# Patient Record
Sex: MALE | Race: WHITE | URBAN - METROPOLITAN AREA
[De-identification: names, ages, dates, MRNs, and addresses within clinical notes are randomized per-mention and may not be internally consistent; named-entity substitution may affect disease eponyms.]

---

## 2017-05-02 ENCOUNTER — IMPORTED ENCOUNTER (OUTPATIENT)
Dept: URBAN - METROPOLITAN AREA CLINIC 50 | Facility: CLINIC | Age: 72
End: 2017-05-02

## 2017-08-01 ENCOUNTER — IMPORTED ENCOUNTER (OUTPATIENT)
Dept: URBAN - METROPOLITAN AREA CLINIC 50 | Facility: CLINIC | Age: 72
End: 2017-08-01

## 2017-08-02 ENCOUNTER — IMPORTED ENCOUNTER (OUTPATIENT)
Dept: URBAN - METROPOLITAN AREA CLINIC 50 | Facility: CLINIC | Age: 72
End: 2017-08-02

## 2017-08-04 ENCOUNTER — IMPORTED ENCOUNTER (OUTPATIENT)
Dept: URBAN - METROPOLITAN AREA CLINIC 50 | Facility: CLINIC | Age: 72
End: 2017-08-04

## 2017-08-10 ENCOUNTER — IMPORTED ENCOUNTER (OUTPATIENT)
Dept: URBAN - METROPOLITAN AREA CLINIC 50 | Facility: CLINIC | Age: 72
End: 2017-08-10

## 2017-08-15 ENCOUNTER — IMPORTED ENCOUNTER (OUTPATIENT)
Dept: URBAN - METROPOLITAN AREA CLINIC 50 | Facility: CLINIC | Age: 72
End: 2017-08-15

## 2017-08-15 NOTE — PATIENT DISCUSSION
"""[place] with IOL OS: 08/24/2017 [employer] MX60 20.0 (OR[place]) Target: [facility] [employer]

## 2017-08-24 ENCOUNTER — IMPORTED ENCOUNTER (OUTPATIENT)
Dept: URBAN - METROPOLITAN AREA CLINIC 50 | Facility: CLINIC | Age: 72
End: 2017-08-24

## 2017-08-29 ENCOUNTER — IMPORTED ENCOUNTER (OUTPATIENT)
Dept: URBAN - METROPOLITAN AREA CLINIC 50 | Facility: CLINIC | Age: 72
End: 2017-08-29

## 2017-10-03 ENCOUNTER — IMPORTED ENCOUNTER (OUTPATIENT)
Dept: URBAN - METROPOLITAN AREA CLINIC 50 | Facility: CLINIC | Age: 72
End: 2017-10-03

## 2018-01-02 ENCOUNTER — IMPORTED ENCOUNTER (OUTPATIENT)
Dept: URBAN - METROPOLITAN AREA CLINIC 50 | Facility: CLINIC | Age: 73
End: 2018-01-02

## 2018-05-01 ENCOUNTER — IMPORTED ENCOUNTER (OUTPATIENT)
Dept: URBAN - METROPOLITAN AREA CLINIC 50 | Facility: CLINIC | Age: 73
End: 2018-05-01

## 2018-07-24 ENCOUNTER — IMPORTED ENCOUNTER (OUTPATIENT)
Dept: URBAN - METROPOLITAN AREA CLINIC 50 | Facility: CLINIC | Age: 73
End: 2018-07-24

## 2019-04-26 NOTE — PATIENT DISCUSSION
[place] Visual Field 36 point screen: I have reviewed the visual fields both taped and untaped on this patient which demonstrate significant obstruction of the patient's peripheral visual field on both eyes.

## 2019-06-11 NOTE — PATIENT DISCUSSION
Also, please do not hesitate to call us if you have any concerns not addressed by this information. Please call [phone] and we will do everything we can to help you during this period.

## 2021-04-17 ASSESSMENT — VISUAL ACUITY
OD_CC: J2@ 15 IN
OS_SC: 20/25-2
OS_SC: 20/30-
OD_CC: J1+
OD_SC: 20/70
OD_CC: J1+
OS_CC: J2@ 15 IN
OS_SC: 20/20
OS_SC: 20/30-
OS_SC: 20/25
OD_BAT: 20/100
OD_PH: 20/30
OD_PH: @ 19 IN
OD_OTHER: 20/30-1. 20/40-1.
OD_OTHER: 20/50. 20/400.
OD_BAT: 20/30-1
OS_CC: 20/40
OS_OTHER: 20/50. 20/100.
OS_OTHER: 20/30-. 20/30-.
OD_SC: 20/80-
OS_CC: J1+
OD_SC: 20/80
OS_BAT: 20/50
OS_SC: 20/20
OD_PH: 20/40
OS_BAT: 20/30-
OS_CC: J1
OD_BAT: 20/50
OS_BAT: 20/25
OS_BAT: 20/30-
OD_SC: 20/50
OS_SC: 20/20
OD_OTHER: 20/100. <20/400.
OD_BAT: 20/50
OS_BAT: 20/20
OS_OTHER: 20/20. 20/20.
OS_OTHER: 20/25. 20/30.
OD_CC: J1+@ 18 IN
OD_PH: 20/30
OD_PH: 20/20
OS_OTHER: 20/40. 20/300.
OD_BAT: 20/50
OD_BAT: 20/50
OD_SC: 20/200
OS_PH: @ 15 IN
OD_OTHER: 20/50. 20/60.
OD_CC: J1
OS_OTHER: 20/30-. 20/40-.
OD_SC: 20/200
OD_SC: 20/200
OD_OTHER: 20/50. 20/60.
OD_OTHER: 20/50. 20/60.
OD_SC: 20/100+1
OD_SC: 20/100
OD_SC: 20/100
OS_BAT: 20/30-
OS_CC: J1+@ 18 IN
OS_CC: J1+
OS_SC: 20/25
OS_BAT: 20/40
OS_OTHER: 20/30-. 20/30-.
OS_SC: 20/20
OD_PH: @ 15 IN

## 2021-04-17 ASSESSMENT — TONOMETRY
OS_IOP_MMHG: 13
OS_IOP_MMHG: 13
OD_IOP_MMHG: 15
OD_IOP_MMHG: 12
OD_IOP_MMHG: 15
OS_IOP_MMHG: 11
OS_IOP_MMHG: 16
OS_IOP_MMHG: 09
OS_IOP_MMHG: 11
OD_IOP_MMHG: 13
OD_IOP_MMHG: 11
OD_IOP_MMHG: 14
OS_IOP_MMHG: 11
OS_IOP_MMHG: 11
OD_IOP_MMHG: 11
OS_IOP_MMHG: 10
OD_IOP_MMHG: 11
OS_IOP_MMHG: 12
OD_IOP_MMHG: 11
OD_IOP_MMHG: 10

## 2021-08-13 ENCOUNTER — PREPPED CHART (OUTPATIENT)
Dept: URBAN - METROPOLITAN AREA CLINIC 52 | Facility: CLINIC | Age: 76
End: 2021-08-13

## 2021-08-17 ENCOUNTER — NEW PATIENT COMPREHENSIVE (OUTPATIENT)
Dept: URBAN - METROPOLITAN AREA CLINIC 52 | Facility: CLINIC | Age: 76
End: 2021-08-17

## 2021-08-17 DIAGNOSIS — D31.32: ICD-10-CM

## 2021-08-17 DIAGNOSIS — H16.223: ICD-10-CM

## 2021-08-17 DIAGNOSIS — H26.492: ICD-10-CM

## 2021-08-17 PROCEDURE — 92004 COMPRE OPH EXAM NEW PT 1/>: CPT

## 2021-08-17 PROCEDURE — 66821 AFTER CATARACT LASER SURGERY: CPT

## 2021-08-17 RX ORDER — PREDNISOLONE ACETATE 10 MG/ML: 1 SUSPENSION/ DROPS OPHTHALMIC TWICE A DAY

## 2021-08-17 ASSESSMENT — TONOMETRY
OD_IOP_MMHG: 12
OS_IOP_MMHG: 12

## 2021-08-17 ASSESSMENT — KERATOMETRY
OS_AXISANGLE_DEGREES: 90
OD_AXISANGLE_DEGREES: 81
OS_K1POWER_DIOPTERS: 46.00
OD_K2POWER_DIOPTERS: 46.25
OS_AXISANGLE2_DEGREES: 180
OD_AXISANGLE2_DEGREES: 171
OD_K1POWER_DIOPTERS: 47.25
OS_K2POWER_DIOPTERS: 46.00

## 2021-08-17 ASSESSMENT — VISUAL ACUITY
OD_PH: 20/30
OS_GLARE: 20/200
OS_GLARE: 20/400
OU_SC: J1 @ 18 INCHES
OD_SC: 20/100
OS_SC: 20/40
OD_GLARE: 20/200
OD_GLARE: 20/400

## 2021-08-17 NOTE — PATIENT DISCUSSION
PCO ([id number]): Visually significant PCO present on exam today. Recommend YAG laser capsulotomy to improve vision and decrease glare symptoms. RBAs of procedure discussed. Patient agrees and wishes to proceed. Yag Cap OS done today with patient's signed consent. Patient instructed to start Prednisolone Acetate and taper as directed.

## 2021-08-17 NOTE — PROCEDURE NOTE: CLINICAL
PROCEDURE NOTE: YAG Capsulotomy OS. Diagnosis: Posterior Capsular Opacification (PCO). Prep: Mydriacil 1% and Phenylephrine 2.5%. Prior to treatment, the risks/benefits/alternatives were discussed. The patient wished to proceed with procedure. Consent was signed. Proparacaine and brominidine were placed into the operative eye after the eye was dilated. Power = 1.7mJ. Number of pulses = 35. Patient tolerated procedure well and there were no complications. Post Laser instructions given. Fadia Mares

## 2021-11-24 ENCOUNTER — YAG CAPSULOTOMY PO (OUTPATIENT)
Dept: URBAN - METROPOLITAN AREA CLINIC 52 | Facility: CLINIC | Age: 76
End: 2021-11-24

## 2021-11-24 DIAGNOSIS — Z98.890: ICD-10-CM

## 2021-11-24 PROCEDURE — 99024 POSTOP FOLLOW-UP VISIT: CPT

## 2021-11-24 PROCEDURE — 92015NC REFRACTION NO CHARGE

## 2021-11-24 ASSESSMENT — KERATOMETRY
OS_K2POWER_DIOPTERS: 46.00
OD_K1POWER_DIOPTERS: 47.25
OS_AXISANGLE2_DEGREES: 180
OS_K1POWER_DIOPTERS: 46.00
OS_AXISANGLE_DEGREES: 90
OD_K2POWER_DIOPTERS: 46.25
OD_AXISANGLE_DEGREES: 81
OD_AXISANGLE2_DEGREES: 171

## 2021-11-24 ASSESSMENT — TONOMETRY
OS_IOP_MMHG: 13
OD_IOP_MMHG: 12

## 2021-11-24 ASSESSMENT — VISUAL ACUITY
OU_SC: 20/20-2
OS_SC: 20/25-1
OU_SC: J1 @16"
OD_SC: 20/100

## 2021-11-24 NOTE — PATIENT DISCUSSION
Glasses prescription not recommended secondary to [address] [place] [address] St.. Patient also defers.

## 2023-04-11 ENCOUNTER — COMPREHENSIVE EXAM (OUTPATIENT)
Dept: URBAN - METROPOLITAN AREA CLINIC 52 | Facility: CLINIC | Age: 78
End: 2023-04-11

## 2023-04-11 DIAGNOSIS — H16.223: ICD-10-CM

## 2023-04-11 DIAGNOSIS — H02.202: ICD-10-CM

## 2023-04-11 DIAGNOSIS — H02.205: ICD-10-CM

## 2023-04-11 DIAGNOSIS — D31.32: ICD-10-CM

## 2023-04-11 PROCEDURE — 92014 COMPRE OPH EXAM EST PT 1/>: CPT

## 2023-04-11 ASSESSMENT — VISUAL ACUITY
OD_SC: 20/80
OU_SC: 20/20
OS_SC: 20/20
OU_SC: J1+

## 2023-04-11 ASSESSMENT — KERATOMETRY
OD_K2POWER_DIOPTERS: 46.25
OS_AXISANGLE2_DEGREES: 180
OS_AXISANGLE_DEGREES: 90
OD_AXISANGLE2_DEGREES: 171
OS_K2POWER_DIOPTERS: 46.00
OD_AXISANGLE_DEGREES: 81
OD_K1POWER_DIOPTERS: 47.25
OS_K1POWER_DIOPTERS: 46.00

## 2023-04-11 ASSESSMENT — TONOMETRY
OS_IOP_MMHG: 13
OD_IOP_MMHG: 13

## 2025-02-17 ENCOUNTER — COMPREHENSIVE EXAM (OUTPATIENT)
Age: 80
End: 2025-02-17

## 2025-02-17 DIAGNOSIS — H02.205: ICD-10-CM

## 2025-02-17 DIAGNOSIS — H02.202: ICD-10-CM

## 2025-02-17 DIAGNOSIS — H02.403: ICD-10-CM

## 2025-02-17 DIAGNOSIS — H16.223: ICD-10-CM

## 2025-02-17 DIAGNOSIS — D31.32: ICD-10-CM

## 2025-02-17 DIAGNOSIS — H11.003: ICD-10-CM

## 2025-02-17 PROCEDURE — 99213 OFFICE O/P EST LOW 20 MIN: CPT
